# Patient Record
Sex: FEMALE | Race: WHITE | NOT HISPANIC OR LATINO | Employment: OTHER | URBAN - METROPOLITAN AREA
[De-identification: names, ages, dates, MRNs, and addresses within clinical notes are randomized per-mention and may not be internally consistent; named-entity substitution may affect disease eponyms.]

---

## 2018-01-22 ENCOUNTER — HOSPITAL ENCOUNTER (OUTPATIENT)
Dept: FAMILY MEDICINE CLINIC | Facility: CLINIC | Age: 73
Setting detail: SPECIMEN
Discharge: HOME | End: 2018-01-22
Attending: NURSE PRACTITIONER | Admitting: NURSE PRACTITIONER

## 2019-06-17 ENCOUNTER — TELEPHONE (OUTPATIENT)
Dept: PHYSICAL THERAPY | Facility: CLINIC | Age: 74
End: 2019-06-17

## 2019-06-18 ENCOUNTER — TRANSCRIBE ORDERS (OUTPATIENT)
Dept: PHYSICAL THERAPY | Facility: CLINIC | Age: 74
End: 2019-06-18

## 2019-06-18 DIAGNOSIS — Z96.651 PRESENCE OF RIGHT ARTIFICIAL KNEE JOINT: Primary | ICD-10-CM

## 2019-06-19 ENCOUNTER — TELEPHONE (OUTPATIENT)
Dept: PHYSICAL THERAPY | Facility: CLINIC | Age: 74
End: 2019-06-19

## 2019-06-19 ENCOUNTER — OFFICE VISIT (OUTPATIENT)
Dept: PHYSICAL THERAPY | Facility: CLINIC | Age: 74
End: 2019-06-19

## 2019-06-19 DIAGNOSIS — Z96.651 PRESENCE OF RIGHT ARTIFICIAL KNEE JOINT: ICD-10-CM

## 2019-06-19 PROCEDURE — 97110 THERAPEUTIC EXERCISES: CPT | Performed by: PHYSICAL THERAPIST

## 2019-06-19 PROCEDURE — 97140 MANUAL THERAPY 1/> REGIONS: CPT | Performed by: PHYSICAL THERAPIST

## 2019-06-19 NOTE — PROGRESS NOTES
Physical Therapy Daily Progress Note    VISIT#: 5    Subjective   Rachael Yen reports: No improvement per pt. Still going to aquatic exercise class, limited HEP participation. Pt received order for dynasplint to help with extension and is still trying to arrange this with rep.      Objective AROM extension = -18 deg pre rx. Knee flexed through stance phase.    See Exercise, Manual, and Modality Logs for complete treatment.     Patient Education: Reviewed POC, importance of HEP participation    Assessment/Plan Extension severely limited, negatively affecting gait.      Progress per Plan of Care            Timed:         Manual Therapy:    15     mins  77902;     Therapeutic Exercise:    25     mins  36043;     Neuromuscular Sedrick:        mins  62750;    Therapeutic Activity:          mins  31266;     Gait Training:           mins  03973;     Ultrasound:          mins  63339;    Ionto                                   mins   41427  Self Care                            mins   81979  Canalith Repos                   mins  4209    Un-Timed:  Electrical Stimulation:         mins  57341 ( );  Dry Needling          mins self-pay  Traction          mins 81896  Low Eval          Mins  66746  Mod Eval          Mins  16386  High Eval                            Mins  05701  Re-Eval                               mins  94455    Timed Treatment:   40   mins   Total Treatment:     40   mins    Gabino Guan, PT  [place] license: [id number]  Physical Therapist

## 2019-06-21 ENCOUNTER — OFFICE VISIT (OUTPATIENT)
Dept: PHYSICAL THERAPY | Facility: CLINIC | Age: 74
End: 2019-06-21

## 2019-06-21 DIAGNOSIS — Z96.651 PRESENCE OF RIGHT ARTIFICIAL KNEE JOINT: Primary | ICD-10-CM

## 2019-06-21 PROCEDURE — 97110 THERAPEUTIC EXERCISES: CPT | Performed by: PHYSICAL THERAPIST

## 2019-06-21 PROCEDURE — 97140 MANUAL THERAPY 1/> REGIONS: CPT | Performed by: PHYSICAL THERAPIST

## 2019-06-21 NOTE — PROGRESS NOTES
Physical Therapy Daily Progress Note    VISIT#: 3    Subjective   Pt reports: Pt has not received her dynamic ext brace yet per her report. HEP going well.       Objective AROM ext = -18 deg, PROM ext = -2 deg post rx.     See Exercise, Manual, and Modality Logs for complete treatment.     Patient Education: POC, HEP participation, purpose of dynamic brace     Assessment/Plan Pre rx extension ROM not improving. Pt reluctant to use dynamic brace. Will continue as tolerated.      Progress per Plan of Care            Timed:         Manual Therapy:    15     mins  51694;     Therapeutic Exercise:    30     mins  70706;     Neuromuscular Sedrick:        mins  64760;    Therapeutic Activity:          mins  87935;     Gait Training:           mins  58008;     Ultrasound:          mins  77444;    Ionto                                   mins   35407  Self Care                            mins   83713    Un-Timed:  Electrical Stimulation:         mins  80304 ( );  Dry Needling          mins self-pay  Traction          mins 03536  Low Eval          Mins  71474  Mod Eval          Mins  36203  High Eval                            Mins  98251  Re-Eval                               mins  56986    Timed Treatment:   45   mins   Total Treatment:     57   mins    Gabino Guan, PT  [place] license: [id number]  Physical Therapist

## 2019-06-24 ENCOUNTER — OFFICE VISIT (OUTPATIENT)
Dept: PHYSICAL THERAPY | Facility: CLINIC | Age: 74
End: 2019-06-24

## 2019-06-24 DIAGNOSIS — Z96.651 PRESENCE OF RIGHT ARTIFICIAL KNEE JOINT: Primary | ICD-10-CM

## 2019-06-24 PROCEDURE — 97140 MANUAL THERAPY 1/> REGIONS: CPT | Performed by: PHYSICAL THERAPIST

## 2019-06-24 PROCEDURE — 97110 THERAPEUTIC EXERCISES: CPT | Performed by: PHYSICAL THERAPIST

## 2019-06-24 NOTE — PROGRESS NOTES
Physical Therapy Daily Progress Note    VISIT#: 4    Subjective   Pt reports: Pt continues to report intermittent HEP participation but voices frustration re: lack of progress. However, she voices understanding of poor rehab prognosis associated with lack of participation in plan of care. Pt has not receive brace due to waiting on insurance approval per [employer] rep.       Objective AROM ext = 15 deg pre rx.     See Exercise, Manual, and Modality Logs for complete treatment.     Patient Education: HEP, Extensive discussion re: need to participate in POC    Assessment/Plan Pt showing no signs of significant improvement and voices limited participation in plan of care. Pt may be reaching her maximum level of function.      Progress per Plan of Care            Timed:         Manual Therapy:    10     mins  84588;     Therapeutic Exercise:    30     mins  55025;     Neuromuscular Sedrick:        mins  36459;    Therapeutic Activity:          mins  03657;     Gait Training:           mins  82723;     Ultrasound:          mins  06232;    Ionto                                   mins   68447  Self Care                            mins   17205    Un-Timed:  Electrical Stimulation:         mins  95551 ( );  Dry Needling          mins self-pay  Traction          mins 51473  Low Eval          Mins  59412  Mod Eval          Mins  82299  High Eval                            Mins  30354  Re-Eval                               mins  53529    Timed Treatment:   40   mins   Total Treatment:     40   mins    Gabino Guan, PT, DPT, OCS  [place] license: [id number]  Physical Therapist

## 2019-10-09 ENCOUNTER — DOCUMENTATION (OUTPATIENT)
Dept: PHYSICAL THERAPY | Facility: CLINIC | Age: 74
End: 2019-10-09

## 2019-10-09 DIAGNOSIS — Z96.651 PRESENCE OF RIGHT ARTIFICIAL KNEE JOINT: Primary | ICD-10-CM

## 2019-10-09 NOTE — PROGRESS NOTES
Discharge Report    Patient Name: Rachael Yen       Patient MRN: [id number]  : 1945  Physician: Job Dash MD  Date: 10/9/2019      Treatment has included therapeutic exercise, manual therapy, electrical stimulation and cryotherapy      Assessment:   Progress towards goals: Not Met    Discharge Reason: Patient did not attend final appointment      Plan of Care: Patient to return to referring/providing physician    Prognosis: poor    Understanding at Discharge: n/a    Gabino Guan PT, DPT, OCS  [place] license: [id number]  Physical Therapist

## 2022-08-19 ENCOUNTER — HOSPITAL ENCOUNTER (EMERGENCY)
Facility: HOSPITAL | Age: 77
Discharge: HOME | End: 2022-08-20
Attending: EMERGENCY MEDICINE | Admitting: EMERGENCY MEDICINE

## 2022-08-19 DIAGNOSIS — K80.20 CALCULUS OF GALLBLADDER WITHOUT CHOLECYSTITIS WITHOUT OBSTRUCTION: ICD-10-CM

## 2022-08-19 DIAGNOSIS — R10.11 RIGHT UPPER QUADRANT ABDOMINAL PAIN: Primary | ICD-10-CM

## 2022-08-19 PROCEDURE — 85025 COMPLETE CBC W/AUTO DIFF WBC: CPT | Performed by: EMERGENCY MEDICINE

## 2022-08-19 PROCEDURE — 81001 URINALYSIS AUTO W/SCOPE: CPT | Performed by: EMERGENCY MEDICINE

## 2022-08-19 PROCEDURE — 84484 ASSAY OF TROPONIN QUANT: CPT | Performed by: EMERGENCY MEDICINE

## 2022-08-19 PROCEDURE — 93005 ELECTROCARDIOGRAM TRACING: CPT | Performed by: EMERGENCY MEDICINE

## 2022-08-19 PROCEDURE — 80053 COMPREHEN METABOLIC PANEL: CPT | Performed by: EMERGENCY MEDICINE

## 2022-08-19 PROCEDURE — 85379 FIBRIN DEGRADATION QUANT: CPT | Performed by: EMERGENCY MEDICINE

## 2022-08-19 PROCEDURE — 83690 ASSAY OF LIPASE: CPT | Performed by: EMERGENCY MEDICINE

## 2022-08-19 PROCEDURE — 99283 EMERGENCY DEPT VISIT LOW MDM: CPT

## 2022-08-19 RX ORDER — SODIUM CHLORIDE 0.9 % (FLUSH) 0.9 %
10 SYRINGE (ML) INJECTION AS NEEDED
Status: DISCONTINUED | OUTPATIENT
Start: 2022-08-19 | End: 2022-08-20 | Stop reason: HOSPADM

## 2022-08-19 RX ADMIN — SODIUM CHLORIDE, POTASSIUM CHLORIDE, SODIUM LACTATE AND CALCIUM CHLORIDE 1000 ML: 600; 310; 30; 20 INJECTION, SOLUTION INTRAVENOUS at 23:56

## 2022-08-20 ENCOUNTER — APPOINTMENT (OUTPATIENT)
Dept: CT IMAGING | Facility: HOSPITAL | Age: 77
End: 2022-08-20

## 2022-08-20 VITALS
OXYGEN SATURATION: 95 % | RESPIRATION RATE: 17 BRPM | SYSTOLIC BLOOD PRESSURE: 118 MMHG | TEMPERATURE: 97.8 F | HEART RATE: 79 BPM | WEIGHT: 192.46 LBS | HEIGHT: 65 IN | BODY MASS INDEX: 32.07 KG/M2 | DIASTOLIC BLOOD PRESSURE: 63 MMHG

## 2022-08-20 LAB
ALBUMIN SERPL-MCNC: 4.6 G/DL (ref 3.5–5.2)
ALBUMIN/GLOB SERPL: 1.6 G/DL
ALP SERPL-CCNC: 90 U/L (ref 39–117)
ALT SERPL W P-5'-P-CCNC: 14 U/L (ref 1–33)
ANION GAP SERPL CALCULATED.3IONS-SCNC: 17 MMOL/L (ref 5–15)
AST SERPL-CCNC: 23 U/L (ref 1–32)
BACTERIA UR QL AUTO: ABNORMAL /HPF
BASOPHILS # BLD AUTO: 0.1 10*3/MM3 (ref 0–0.2)
BASOPHILS NFR BLD AUTO: 1.3 % (ref 0–1.5)
BILIRUB SERPL-MCNC: 0.5 MG/DL (ref 0–1.2)
BILIRUB UR QL STRIP: NEGATIVE
BUN SERPL-MCNC: 17 MG/DL (ref 8–23)
BUN/CREAT SERPL: 20.7 (ref 7–25)
CALCIUM SPEC-SCNC: 9.9 MG/DL (ref 8.6–10.5)
CHLORIDE SERPL-SCNC: 99 MMOL/L (ref 98–107)
CLARITY UR: CLEAR
CO2 SERPL-SCNC: 23 MMOL/L (ref 22–29)
COLOR UR: YELLOW
CREAT SERPL-MCNC: 0.82 MG/DL (ref 0.57–1)
D DIMER PPP FEU-MCNC: 1.03 MG/L (FEU) (ref 0–0.59)
DEPRECATED RDW RBC AUTO: 45.9 FL (ref 37–54)
EGFRCR SERPLBLD CKD-EPI 2021: 73.3 ML/MIN/1.73
EOSINOPHIL # BLD AUTO: 0.3 10*3/MM3 (ref 0–0.4)
EOSINOPHIL NFR BLD AUTO: 3 % (ref 0.3–6.2)
ERYTHROCYTE [DISTWIDTH] IN BLOOD BY AUTOMATED COUNT: 15.1 % (ref 12.3–15.4)
GLOBULIN UR ELPH-MCNC: 2.9 GM/DL
GLUCOSE SERPL-MCNC: 100 MG/DL (ref 65–99)
GLUCOSE UR STRIP-MCNC: NEGATIVE MG/DL
HCT VFR BLD AUTO: 43 % (ref 34–46.6)
HGB BLD-MCNC: 14.6 G/DL (ref 12–15.9)
HGB UR QL STRIP.AUTO: ABNORMAL
HYALINE CASTS UR QL AUTO: ABNORMAL /LPF
KETONES UR QL STRIP: NEGATIVE
LEUKOCYTE ESTERASE UR QL STRIP.AUTO: NEGATIVE
LIPASE SERPL-CCNC: 34 U/L (ref 13–60)
LYMPHOCYTES # BLD AUTO: 2.6 10*3/MM3 (ref 0.7–3.1)
LYMPHOCYTES NFR BLD AUTO: 30.7 % (ref 19.6–45.3)
MCH RBC QN AUTO: 29.6 PG (ref 26.6–33)
MCHC RBC AUTO-ENTMCNC: 33.9 G/DL (ref 31.5–35.7)
MCV RBC AUTO: 87.3 FL (ref 79–97)
MONOCYTES # BLD AUTO: 1.2 10*3/MM3 (ref 0.1–0.9)
MONOCYTES NFR BLD AUTO: 13.8 % (ref 5–12)
NEUTROPHILS NFR BLD AUTO: 4.3 10*3/MM3 (ref 1.7–7)
NEUTROPHILS NFR BLD AUTO: 51.2 % (ref 42.7–76)
NITRITE UR QL STRIP: NEGATIVE
NRBC BLD AUTO-RTO: 0.1 /100 WBC (ref 0–0.2)
PH UR STRIP.AUTO: 5.5 [PH] (ref 5–8)
PLATELET # BLD AUTO: 251 10*3/MM3 (ref 140–450)
PMV BLD AUTO: 8.1 FL (ref 6–12)
POTASSIUM SERPL-SCNC: 3.8 MMOL/L (ref 3.5–5.2)
PROT SERPL-MCNC: 7.5 G/DL (ref 6–8.5)
PROT UR QL STRIP: NEGATIVE
RBC # BLD AUTO: 4.93 10*6/MM3 (ref 3.77–5.28)
RBC # UR STRIP: ABNORMAL /HPF
REF LAB TEST METHOD: ABNORMAL
SODIUM SERPL-SCNC: 139 MMOL/L (ref 136–145)
SP GR UR STRIP: <=1.005 (ref 1–1.03)
SQUAMOUS #/AREA URNS HPF: ABNORMAL /HPF
TROPONIN T SERPL-MCNC: <0.01 NG/ML (ref 0–0.03)
UROBILINOGEN UR QL STRIP: ABNORMAL
WBC # UR STRIP: ABNORMAL /HPF
WBC NRBC COR # BLD: 8.5 10*3/MM3 (ref 3.4–10.8)

## 2022-08-20 PROCEDURE — 0 IOPAMIDOL PER 1 ML: Performed by: EMERGENCY MEDICINE

## 2022-08-20 PROCEDURE — 71275 CT ANGIOGRAPHY CHEST: CPT

## 2022-08-20 PROCEDURE — 74177 CT ABD & PELVIS W/CONTRAST: CPT

## 2022-08-20 RX ADMIN — IOPAMIDOL 100 ML: 755 INJECTION, SOLUTION INTRAVENOUS at 01:29

## 2022-08-20 NOTE — DISCHARGE INSTRUCTIONS
[place] diet no greasy fried fatty spicy food no caffeine.  Pepcid AC.  Return for fever increasing pain reoccurrence of symptoms turning yellow uncontrolled pain vomiting cannot hold anything down chest pain neck arm jaw pain shortness of breath or any other new or worse problems or concerns

## 2022-08-20 NOTE — ED PROVIDER NOTES
Subjective   Complaint right side pain    History of present illness a 77-year-old female who complains 1 day history of some pain intermittently in that right side of the ribs she thinks it is due to her gallbladder food poisoning or appendix.  There is no chest pain neck arm jaw pain or shortness of breath.  This been intermittent throughout the day worse over the last few hours but now improving.  Patient states he got worse after she ate some over ripe apricots and drink some milk.  There is no dizziness or syncope.  No sweating she had vomited once no diarrhea no black or bloody stool no urinary complaints.  Location right upper abdomen intermittent severe sharp and stabbing aching in nature nonradiating she has been at home and has the above associated symptoms.  No fever chills or night sweats          Review of Systems   Constitutional: Negative for chills and fever.   HENT: Negative for congestion and sinus pressure.    Eyes: Negative for photophobia and visual disturbance.   Respiratory: Negative for cough, chest tightness and shortness of breath.    Cardiovascular: Negative for chest pain and palpitations.   Gastrointestinal: Positive for abdominal pain and vomiting. Negative for blood in stool.   Endocrine: Negative for cold intolerance and heat intolerance.   Genitourinary: Negative for difficulty urinating and dysuria.   Musculoskeletal: Negative for back pain and neck pain.   Skin: Negative for color change and rash.   Neurological: Negative for dizziness and light-headedness.   Psychiatric/Behavioral: Negative for agitation and behavioral problems.       No past medical history on file.  No health problems  No Known Allergies    No past surgical history on file.  Partial hysterectomy  No family history on file.  Family history of some heart disease hypertension  Social History     Socioeconomic History   • Marital status: Single   Social history lives at home no tobacco no vaping no alcohol no  drug  Medications none      Objective   Physical Exam  Constitutional is a 77-year-old female awake alert no distress temperature 97.8 blood pressure 129/69 heart rate 86 respirations 17 sats 97% on room air.  HEENT extraocular muscles are intact pupils equal reactive sclerae clear mouth clear.  Neck supple no adenopathy no JVD no bruits no meningeal signs.  Lungs clear no retractions.  Back no CVA tenderness.  Heart regular without murmur rub.  Abdomen soft nontender nondistended good bowel sounds no peritoneal findings or pulsatile masses.  There is minimal tenderness right upper quadrant but no Mena sign.  Extremities pulses are equal throughout upper and lower extremities no edema cords or Homans' sign or evidence of DVT.  Skin is warm and dry without rashes or cellulitic change.  Neurologic awake alert follows commands motor strength normal without focal weakness  Procedures           ED Course      Results for orders placed or performed during the hospital encounter of 08/19/22   Comprehensive Metabolic Panel    Specimen: Arm, Right; Blood   Result Value Ref Range    Glucose 100 (H) 65 - 99 mg/dL    BUN 17 8 - 23 mg/dL    Creatinine 0.82 0.57 - 1.00 mg/dL    Sodium 139 136 - 145 mmol/L    Potassium 3.8 3.5 - 5.2 mmol/L    Chloride 99 98 - 107 mmol/L    CO2 23.0 22.0 - 29.0 mmol/L    Calcium 9.9 8.6 - 10.5 mg/dL    Total Protein 7.5 6.0 - 8.5 g/dL    Albumin 4.60 3.50 - 5.20 g/dL    ALT (SGPT) 14 1 - 33 U/L    AST (SGOT) 23 1 - 32 U/L    Alkaline Phosphatase 90 39 - 117 U/L    Total Bilirubin 0.5 0.0 - 1.2 mg/dL    Globulin 2.9 gm/dL    A/G Ratio 1.6 g/dL    BUN/Creatinine Ratio 20.7 7.0 - 25.0    Anion Gap 17.0 (H) 5.0 - 15.0 mmol/L    eGFR 73.3 >60.0 mL/min/1.73   Lipase    Specimen: Arm, Right; Blood   Result Value Ref Range    Lipase 34 13 - 60 U/L   Urinalysis With Microscopic If Indicated (No Culture) - Urine, Clean Catch    Specimen: Urine, Clean Catch   Result Value Ref Range    Color, UA Yellow  Yellow, Straw    Appearance, UA Clear Clear    pH, UA 5.5 5.0 - 8.0    Specific Gravity, UA <=1.005 1.005 - 1.030    Glucose, UA Negative Negative    Ketones, UA Negative Negative    Bilirubin, UA Negative Negative    Blood, UA Trace (A) Negative    Protein, UA Negative Negative    Leuk Esterase, UA Negative Negative    Nitrite, UA Negative Negative    Urobilinogen, UA 0.2 E.U./dL 0.2 - 1.0 E.U./dL   D-dimer, Quantitative    Specimen: Arm, Right; Blood   Result Value Ref Range    D-Dimer, Quantitative 1.03 (H) 0.00 - 0.59 mg/L (FEU)   Troponin    Specimen: Arm, Right; Blood   Result Value Ref Range    Troponin T <0.010 0.000 - 0.030 ng/mL   CBC Auto Differential    Specimen: Arm, Right; Blood   Result Value Ref Range    WBC 8.50 3.40 - 10.80 10*3/mm3    RBC 4.93 3.77 - 5.28 10*6/mm3    Hemoglobin 14.6 12.0 - 15.9 g/dL    Hematocrit 43.0 34.0 - 46.6 %    MCV 87.3 79.0 - 97.0 fL    MCH 29.6 26.6 - 33.0 pg    MCHC 33.9 31.5 - 35.7 g/dL    RDW 15.1 12.3 - 15.4 %    RDW-SD 45.9 37.0 - 54.0 fl    MPV 8.1 6.0 - 12.0 fL    Platelets 251 140 - 450 10*3/mm3    Neutrophil % 51.2 42.7 - 76.0 %    Lymphocyte % 30.7 19.6 - 45.3 %    Monocyte % 13.8 (H) 5.0 - 12.0 %    Eosinophil % 3.0 0.3 - 6.2 %    Basophil % 1.3 0.0 - 1.5 %    Neutrophils, Absolute 4.30 1.70 - 7.00 10*3/mm3    Lymphocytes, Absolute 2.60 0.70 - 3.10 10*3/mm3    Monocytes, Absolute 1.20 (H) 0.10 - 0.90 10*3/mm3    Eosinophils, Absolute 0.30 0.00 - 0.40 10*3/mm3    Basophils, Absolute 0.10 0.00 - 0.20 10*3/mm3    nRBC 0.1 0.0 - 0.2 /100 WBC   Urinalysis, Microscopic Only - Urine, Clean Catch    Specimen: Urine, Clean Catch   Result Value Ref Range    RBC, UA 0-2 (A) None Seen /HPF    WBC, UA 0-2 (A) None Seen /HPF    Bacteria, UA None Seen None Seen /HPF    Squamous Epithelial Cells, UA 0-2 None Seen, 0-2 /HPF    Hyaline Casts, UA None Seen None Seen /LPF    Methodology Automated Microscopy    ECG 12 Lead   Result Value Ref Range    QT Interval 380 ms     CT  Abdomen Pelvis With Contrast    Result Date: 8/20/2022  1. No acute abnormality. 2. Extensive colonic diverticulosis. 3. Constipation. 4. Cholelithiasis. 5. Severe atherosclerotic disease. Slot 67 Electronically signed by:  Kike Delaney M.D.  8/20/2022 12:08 AM    CT Angiogram Chest Pulmonary Embolism    Result Date: 8/20/2022  1. No pulmonary embolus. 2. There is mild aneurysmal dilatation of the ascending aorta measuring 4.1 cm. There are severe atherosclerotic changes throughout the thoracic aorta. 3. Small hiatal hernia. 4. Borderline cardiomegaly. Slot 67 Electronically signed by:  Kike Delaney M.D.  8/20/2022 12:01 AM    Medications   lactated ringers bolus 1,000 mL (0 mL Intravenous Stopped 8/20/22 0130)   iopamidol (ISOVUE-370) 76 % injection 100 mL (100 mL Intravenous Given 8/20/22 0129)          EKG my interpretation normal sinus rhythm rate 82 right bundle branch block QTC of 443 nothing old to compare with abnormal EKG                                  MDM  Number of Diagnoses or Management Options  Calculus of gallbladder without cholecystitis without obstruction: new and requires workup  Right upper quadrant abdominal pain: new and requires workup  Diagnosis management comments: Medical decision making.  Patient had IV established given a liter lactated Ringer's bolus and had the above exam evaluation EKG obtained on my review showed a sinus rhythm with right bundle branch block but nothing old to compare with.  The patient laboratory evaluation reviewed by me chemistries unremarkable the patient had a liver panel which was negative lipase normal urine negative D-dimer 1.03 opponent was negative CBC normal patient underwent a CT scan chest abdomen pelvis.  There was no pulmonary embolism had mild aneurysmal dilatation of the ascending aorta at 4.1 cm.  Severe atherosclerotic changes throughout the aorta hiatal hernia and some mild cardiomegaly.  No dissection CT abdomen pelvis showed extensive  diverticulosis without diverticulitis constipation cholelithiasis but no acute cholecystitis.  Atherosclerotic disease noted.  The patient on repeat exam was resting company she had no pain.  I see no evidence of DVT pulmonary embolism no evidence of PE or dissection or acute myocardial infarction.  No evidence of sepsis no evidence of cholecystitis.  No evidence of an AAA.  The patient was made aware of the findings and her pain initially was in the right upper quadrant and started after eating some overnight apricots and drinking milk.  I suspect her symptoms of consistent with a gallbladder appendix was normal.  Patient desires to go home.  She wants to follow with a surgeon outpatient.  We talked about what to return for talked about gallbladder diet information provided.  There is no evidence of acute pancreatitis or obstructive lesions anywhere.  This not a complete list of all possibilities of abdominal pain.  No evidence of acute cardiac issues currently.  She referred for outpatient surgery and surgeon.  She voiced understanding and was discharged home for outpatient management.  All labs CT scans obtained reviewed by me as well as EKG.       Amount and/or Complexity of Data Reviewed  Clinical lab tests: reviewed  Tests in the radiology section of CPT®: reviewed  Tests in the medicine section of CPT®: reviewed    Risk of Complications, Morbidity, and/or Mortality  Presenting problems: high  Diagnostic procedures: high  Management options: high    Patient Progress  Patient progress: stable      Final diagnoses:   Right upper quadrant abdominal pain   Calculus of gallbladder without cholecystitis without obstruction       ED Disposition  ED Disposition     ED Disposition   Discharge    Condition   Stable    Comment   --             Joaquin Waite MD  [address] [place] [address]  [phone]    Call in 2 days  Call Monday to set up appointment for your gallbladder.    Finn Fountain,  MD [address]  [place] [address]  [phone]    In 3 days  Small 4 cm ascending thoracic aortic aneurysm please schedule follow-up         Medication List      No changes were made to your prescriptions during this visit.          Marty Webber MD  08/20/22 0714

## 2022-08-20 NOTE — ED NOTES
Patient complains of abdominal pain, vomiting, and right rib pain. States she has not been feeling well for two days. States she does not take medications at home. Family at bedside

## 2022-08-22 LAB — QT INTERVAL: 380 MS

## 2022-09-01 ENCOUNTER — OFFICE VISIT (OUTPATIENT)
Dept: SURGERY | Facility: CLINIC | Age: 77
End: 2022-09-01

## 2022-09-01 VITALS
OXYGEN SATURATION: 98 % | RESPIRATION RATE: 18 BRPM | HEIGHT: 65 IN | HEART RATE: 61 BPM | SYSTOLIC BLOOD PRESSURE: 137 MMHG | WEIGHT: 191 LBS | TEMPERATURE: 98 F | BODY MASS INDEX: 31.82 KG/M2 | DIASTOLIC BLOOD PRESSURE: 81 MMHG

## 2022-09-01 DIAGNOSIS — K80.20 SYMPTOMATIC CHOLELITHIASIS: Primary | ICD-10-CM

## 2022-09-01 PROCEDURE — 99204 OFFICE O/P NEW MOD 45 MIN: CPT | Performed by: SURGERY

## 2022-09-01 RX ORDER — SODIUM CHLORIDE 0.9 % (FLUSH) 0.9 %
3-10 SYRINGE (ML) INJECTION AS NEEDED
Status: CANCELLED | OUTPATIENT
Start: 2022-09-01

## 2022-09-01 RX ORDER — SODIUM CHLORIDE 9 MG/ML
100 INJECTION, SOLUTION INTRAVENOUS CONTINUOUS
Status: CANCELLED | OUTPATIENT
Start: 2022-09-01

## 2022-09-01 RX ORDER — SODIUM CHLORIDE 0.9 % (FLUSH) 0.9 %
3 SYRINGE (ML) INJECTION EVERY 12 HOURS SCHEDULED
Status: CANCELLED | OUTPATIENT
Start: 2022-09-01

## 2022-09-01 NOTE — PROGRESS NOTES
"Subjective   Rachael Yen is a 77 y.o. female.   Chief Complaint   Patient presents with   • Cholelithiasis     NP Ref [facility] ER, Gallstones      /81 (BP Location: Right arm, Patient Position: Sitting, Cuff Size: Adult)   Pulse 61   Temp 98 °F (36.7 °C) (Infrared)   Resp 18   Ht 165.1 cm (65\")   Wt 86.6 kg (191 lb)   SpO2 98%   BMI 31.78 kg/m²     HISTORY OF PRESENT ILLNESS:  77-year-old lady is been referred to me after seen in the emergency department for epigastric and right upper quad abdominal pain.  She says that it was a sharp pain that came acutely on last about an hour to resolve by itself.  Has happened before several months ago.  It was associated with severe nausea and vomiting.  Usually happens in the evenings.  Unsure about any other inciting factor.  On arrival in the emergency department that she was found to have a tender abdominal exam.  She had no significant leukocytosis her LFTs were normal.  She had a CT scan demonstrating cholelithiasis and then was referred to me for symptomatic cholelithiasis.      No outpatient encounter medications on file as of 9/1/2022.     No facility-administered encounter medications on file as of 9/1/2022.         The following portions of the patient's history were reviewed and updated as appropriate: allergies, current medications, past family history, past medical history, past social history, past surgical history and problem list.    Review of Systems  Complete review of system has been obtained and is negative.  Objective     Physical Exam  Constitutional:       General: She is not in acute distress.     Appearance: She is ill-appearing.   HENT:      Head: Normocephalic and atraumatic.   Eyes:      General: No scleral icterus.     Conjunctiva/sclera: Conjunctivae normal.   Cardiovascular:      Rate and Rhythm: Normal rate.   Pulmonary:      Effort: Pulmonary effort is normal. No respiratory distress.   Abdominal:      General: There is no " distension.      Palpations: Abdomen is soft.      Tenderness: There is no abdominal tenderness.   Musculoskeletal:         General: Swelling present. No tenderness.   Skin:     General: Skin is warm and dry.   Neurological:      General: No focal deficit present.      Mental Status: Mental status is at baseline.   Psychiatric:         Mood and Affect: Mood normal.         Behavior: Behavior normal.           Assessment & Plan   Diagnoses and all orders for this visit:    1. Symptomatic cholelithiasis (Primary)  -     Case Request; Standing  -     Case Request    Other orders  -     Follow Anesthesia Guidelines / Standing Orders; Future  -     Obtain Informed Consent; Future  -     Provide NPO Instructions to Patient; Future  -     Chlorhexidine Skin Prep; Future    I counseled her about the findings on her CT scan the right upper quadrant symptoms and that she could have symptomatic cholelithiasis.  There was not any LFT abnormality to suggest common bile duct stone.  We talked about nonoperative management.  We talked about surgical management including laparoscopic and possible open cholecystectomy.  We talked with the steps of the operation to spitter cover the possible complications even talked about bile duct injury and open surgery.  After discussion about symptomatic cholelithiasis she understands the risk of the surgery and does want to proceed.    Is counseling about major abdominal surgery with risk factors for bitty and moderate data reviewed including CT scan abdomen and pelvis the emergency department notes her lab work including CBC and CMP.    Joaquin Waite MD  9/1/2022  1:25 PM EDT    This note was created using Dragon Voice Recognition software.

## 2022-09-08 ENCOUNTER — LAB (OUTPATIENT)
Dept: LAB | Facility: HOSPITAL | Age: 77
End: 2022-09-08

## 2022-09-08 LAB
DEPRECATED RDW RBC AUTO: 41.7 FL (ref 37–54)
ERYTHROCYTE [DISTWIDTH] IN BLOOD BY AUTOMATED COUNT: 13.3 % (ref 12.3–15.4)
HCT VFR BLD AUTO: 42.3 % (ref 34–46.6)
HGB BLD-MCNC: 14 G/DL (ref 12–15.9)
MCH RBC QN AUTO: 28.2 PG (ref 26.6–33)
MCHC RBC AUTO-ENTMCNC: 33.1 G/DL (ref 31.5–35.7)
MCV RBC AUTO: 85.1 FL (ref 79–97)
PLATELET # BLD AUTO: 248 10*3/MM3 (ref 140–450)
PMV BLD AUTO: 10.8 FL (ref 6–12)
RBC # BLD AUTO: 4.97 10*6/MM3 (ref 3.77–5.28)
WBC NRBC COR # BLD: 8.27 10*3/MM3 (ref 3.4–10.8)

## 2022-09-08 PROCEDURE — 85027 COMPLETE CBC AUTOMATED: CPT

## 2022-09-21 ENCOUNTER — HOSPITAL ENCOUNTER (OUTPATIENT)
Facility: HOSPITAL | Age: 77
Setting detail: HOSPITAL OUTPATIENT SURGERY
Discharge: HOME | End: 2022-09-21
Attending: SURGERY | Admitting: SURGERY

## 2022-09-21 ENCOUNTER — ANESTHESIA EVENT (OUTPATIENT)
Dept: PERIOP | Facility: HOSPITAL | Age: 77
End: 2022-09-21

## 2022-09-21 ENCOUNTER — ANESTHESIA (OUTPATIENT)
Dept: PERIOP | Facility: HOSPITAL | Age: 77
End: 2022-09-21

## 2022-09-21 VITALS
RESPIRATION RATE: 9 BRPM | HEART RATE: 57 BPM | SYSTOLIC BLOOD PRESSURE: 158 MMHG | WEIGHT: 192.6 LBS | DIASTOLIC BLOOD PRESSURE: 85 MMHG | BODY MASS INDEX: 30.95 KG/M2 | HEIGHT: 66 IN | TEMPERATURE: 97.9 F | OXYGEN SATURATION: 100 %

## 2022-09-21 DIAGNOSIS — K80.20 SYMPTOMATIC CHOLELITHIASIS: ICD-10-CM

## 2022-09-21 PROCEDURE — 25010000002 PROPOFOL 10 MG/ML EMULSION: Performed by: NURSE ANESTHETIST, CERTIFIED REGISTERED

## 2022-09-21 PROCEDURE — 88304 TISSUE EXAM BY PATHOLOGIST: CPT | Performed by: SURGERY

## 2022-09-21 PROCEDURE — 25010000002 FENTANYL CITRATE (PF) 50 MCG/ML SOLUTION: Performed by: NURSE ANESTHETIST, CERTIFIED REGISTERED

## 2022-09-21 PROCEDURE — 47562 LAPAROSCOPIC CHOLECYSTECTOMY: CPT | Performed by: SURGERY

## 2022-09-21 PROCEDURE — 25010000002 FENTANYL CITRATE (PF) 100 MCG/2ML SOLUTION: Performed by: NURSE ANESTHETIST, CERTIFIED REGISTERED

## 2022-09-21 PROCEDURE — 25010000002 CEFAZOLIN PER 500 MG: Performed by: SURGERY

## 2022-09-21 PROCEDURE — 25010000002 HYDROMORPHONE PER 4 MG: Performed by: NURSE ANESTHETIST, CERTIFIED REGISTERED

## 2022-09-21 PROCEDURE — 25010000002 DEXAMETHASONE PER 1 MG: Performed by: NURSE ANESTHETIST, CERTIFIED REGISTERED

## 2022-09-21 PROCEDURE — 25010000002 ONDANSETRON PER 1 MG: Performed by: NURSE ANESTHETIST, CERTIFIED REGISTERED

## 2022-09-21 DEVICE — HEMOST ABS SURGICEL PWDR 3GM: Type: IMPLANTABLE DEVICE | Site: ABDOMEN | Status: FUNCTIONAL

## 2022-09-21 RX ORDER — DEXAMETHASONE SODIUM PHOSPHATE 4 MG/ML
INJECTION, SOLUTION INTRA-ARTICULAR; INTRALESIONAL; INTRAMUSCULAR; INTRAVENOUS; SOFT TISSUE AS NEEDED
Status: DISCONTINUED | OUTPATIENT
Start: 2022-09-21 | End: 2022-09-21 | Stop reason: SURG

## 2022-09-21 RX ORDER — OXYCODONE HYDROCHLORIDE 5 MG/1
5 TABLET ORAL ONCE AS NEEDED
Status: DISCONTINUED | OUTPATIENT
Start: 2022-09-21 | End: 2022-09-21 | Stop reason: HOSPADM

## 2022-09-21 RX ORDER — SODIUM CHLORIDE, SODIUM LACTATE, POTASSIUM CHLORIDE, AND CALCIUM CHLORIDE .6; .31; .03; .02 G/100ML; G/100ML; G/100ML; G/100ML
20 INJECTION, SOLUTION INTRAVENOUS CONTINUOUS
Status: DISCONTINUED | OUTPATIENT
Start: 2022-09-21 | End: 2022-09-21 | Stop reason: HOSPADM

## 2022-09-21 RX ORDER — HYDROMORPHONE HCL 110MG/55ML
PATIENT CONTROLLED ANALGESIA SYRINGE INTRAVENOUS AS NEEDED
Status: DISCONTINUED | OUTPATIENT
Start: 2022-09-21 | End: 2022-09-21 | Stop reason: SURG

## 2022-09-21 RX ORDER — ONDANSETRON 2 MG/ML
INJECTION INTRAMUSCULAR; INTRAVENOUS AS NEEDED
Status: DISCONTINUED | OUTPATIENT
Start: 2022-09-21 | End: 2022-09-21 | Stop reason: SURG

## 2022-09-21 RX ORDER — HYDROCODONE BITARTRATE AND ACETAMINOPHEN 5; 325 MG/1; MG/1
1 TABLET ORAL EVERY 4 HOURS PRN
Qty: 10 TABLET | Refills: 0 | Status: SHIPPED | OUTPATIENT
Start: 2022-09-21 | End: 2022-10-04

## 2022-09-21 RX ORDER — FENTANYL CITRATE 50 UG/ML
INJECTION, SOLUTION INTRAMUSCULAR; INTRAVENOUS AS NEEDED
Status: DISCONTINUED | OUTPATIENT
Start: 2022-09-21 | End: 2022-09-21 | Stop reason: SURG

## 2022-09-21 RX ORDER — PROPOFOL 10 MG/ML
VIAL (ML) INTRAVENOUS AS NEEDED
Status: DISCONTINUED | OUTPATIENT
Start: 2022-09-21 | End: 2022-09-21 | Stop reason: SURG

## 2022-09-21 RX ORDER — SODIUM CHLORIDE 9 MG/ML
100 INJECTION, SOLUTION INTRAVENOUS CONTINUOUS
Status: DISCONTINUED | OUTPATIENT
Start: 2022-09-21 | End: 2022-09-21 | Stop reason: HOSPADM

## 2022-09-21 RX ORDER — FENTANYL CITRATE 50 UG/ML
50 INJECTION, SOLUTION INTRAMUSCULAR; INTRAVENOUS
Status: DISCONTINUED | OUTPATIENT
Start: 2022-09-21 | End: 2022-09-21 | Stop reason: HOSPADM

## 2022-09-21 RX ORDER — BUPIVACAINE HYDROCHLORIDE AND EPINEPHRINE 2.5; 5 MG/ML; UG/ML
INJECTION, SOLUTION EPIDURAL; INFILTRATION; INTRACAUDAL; PERINEURAL AS NEEDED
Status: DISCONTINUED | OUTPATIENT
Start: 2022-09-21 | End: 2022-09-21 | Stop reason: HOSPADM

## 2022-09-21 RX ORDER — HYDROCODONE BITARTRATE AND ACETAMINOPHEN 10; 325 MG/1; MG/1
1 TABLET ORAL EVERY 4 HOURS PRN
Status: DISCONTINUED | OUTPATIENT
Start: 2022-09-21 | End: 2022-09-21 | Stop reason: HOSPADM

## 2022-09-21 RX ORDER — SODIUM CHLORIDE 0.9 % (FLUSH) 0.9 %
3-10 SYRINGE (ML) INJECTION AS NEEDED
Status: DISCONTINUED | OUTPATIENT
Start: 2022-09-21 | End: 2022-09-21 | Stop reason: HOSPADM

## 2022-09-21 RX ORDER — SODIUM CHLORIDE 0.9 % (FLUSH) 0.9 %
3 SYRINGE (ML) INJECTION EVERY 12 HOURS SCHEDULED
Status: DISCONTINUED | OUTPATIENT
Start: 2022-09-21 | End: 2022-09-21 | Stop reason: HOSPADM

## 2022-09-21 RX ADMIN — CEFAZOLIN 2 G: 2 INJECTION, POWDER, FOR SOLUTION INTRAMUSCULAR; INTRAVENOUS at 07:29

## 2022-09-21 RX ADMIN — FENTANYL CITRATE 50 MCG: 50 INJECTION, SOLUTION INTRAMUSCULAR; INTRAVENOUS at 07:44

## 2022-09-21 RX ADMIN — FENTANYL CITRATE 50 MCG: 50 INJECTION, SOLUTION INTRAMUSCULAR; INTRAVENOUS at 09:03

## 2022-09-21 RX ADMIN — FENTANYL CITRATE 50 MCG: 50 INJECTION, SOLUTION INTRAMUSCULAR; INTRAVENOUS at 08:38

## 2022-09-21 RX ADMIN — ONDANSETRON 8 MG: 2 INJECTION INTRAMUSCULAR; INTRAVENOUS at 07:47

## 2022-09-21 RX ADMIN — HYDROMORPHONE HYDROCHLORIDE 0.5 MG: 2 INJECTION, SOLUTION INTRAMUSCULAR; INTRAVENOUS; SUBCUTANEOUS at 07:54

## 2022-09-21 RX ADMIN — DEXAMETHASONE SODIUM PHOSPHATE 4 MG: 4 INJECTION, SOLUTION INTRAMUSCULAR; INTRAVENOUS at 07:47

## 2022-09-21 RX ADMIN — FENTANYL CITRATE 50 MCG: 50 INJECTION, SOLUTION INTRAMUSCULAR; INTRAVENOUS at 07:29

## 2022-09-21 RX ADMIN — PROPOFOL 200 MG: 10 INJECTION, EMULSION INTRAVENOUS at 07:29

## 2022-09-21 RX ADMIN — HYDROCODONE BITARTRATE AND ACETAMINOPHEN 1 TABLET: 10; 325 TABLET ORAL at 08:38

## 2022-09-21 NOTE — ANESTHESIA PREPROCEDURE EVALUATION
Anesthesia Evaluation     Patient summary reviewed and Nursing notes reviewed   no history of anesthetic complications:  NPO Solid Status: > 8 hours  NPO Liquid Status: > 2 hours           Airway   Mallampati: II  TM distance: >3 FB  Neck ROM: full  No difficulty expected  Dental - normal exam     Pulmonary - negative pulmonary ROS and normal exam   Cardiovascular - negative cardio ROS and normal exam        Neuro/Psych- negative ROS  GI/Hepatic/Renal/Endo - negative ROS     Musculoskeletal     Abdominal  - normal exam   Substance History - negative use     OB/GYN negative ob/gyn ROS         Other   arthritis,                      Anesthesia Plan    ASA 2     general     intravenous induction     Anesthetic plan, risks, benefits, and alternatives have been provided, discussed and informed consent has been obtained with: patient.  Pre-procedure education provided  Use of blood products discussed with patient  Consented to blood products.   Plan discussed with CRNA.        CODE STATUS:

## 2022-09-21 NOTE — ANESTHESIA PROCEDURE NOTES
Airway  Urgency: elective    Date/Time: 9/21/2022 7:28 AM    General Information and Staff    Patient location during procedure: OR    Indications and Patient Condition  Indications for airway management: airway protection    Preoxygenated: yes  MILS maintained throughout  Mask difficulty assessment: 1 - vent by mask    Final Airway Details  Final airway type: endotracheal airway      Successful airway: ETT  Cuffed: yes   Successful intubation technique: direct laryngoscopy  Facilitating devices/methods: intubating stylet  Blade: Ngozi  Blade size: 3  ETT size (mm): 7.0  Cormack-Lehane Classification: grade IIa - partial view of glottis  Placement verified by: chest auscultation   Inital cuff pressure (cm H2O): 5  Measured from: lips  ETT/EBT  to lips (cm): 22  Number of attempts at approach: 1  Assessment: lips, teeth, and gum same as pre-op

## 2022-09-21 NOTE — OP NOTE
Operative Report:    Patient Name:  Rachael Yen  YOB: 1945    Date of Surgery:  9/21/2022     Indications: 77-year-old lady sent to me for cholecystectomy for symptomatic cholelithiasis lithiasis.  I counseled her about the risk and benefits of the cholecystectomy she understood and wished to proceed.    Pre-op Diagnosis:   Symptomatic cholelithiasis [K80.20]       Post-Op Diagnosis Codes:     * Symptomatic cholelithiasis [K80.20]    Procedure/CPT® Codes:      Procedure(s):  CHOLECYSTECTOMY LAPAROSCOPIC    Staff:  Surgeon(s):  Joaquin Waite MD    Circulator: Estelita Miranda RN  Scrub Person: Isis Fonseca  Assistant: Azalia Singh  was responsible for performing the following activities: Retraction, Closing and Held/Positioned Camera and their skilled assistance was necessary for the success of this case.        Anesthesia: General    Estimated Blood Loss: minimal    Implants:    Implant Name Type Inv. Item Serial No.  Lot No. LRB No. Used Action   HEMOST ABS SURGICEL PWDR 3GM - [id number] Implant HEMOST ABS SURGICEL PWDR 3GM  [employer] [id number] N/A 1 Implanted       Specimen:          Specimens     ID Source Type Tests Collected By Collected At Frozen?    A Gallbladder Tissue · TISSUE PATHOLOGY EXAM   Joaquin Waite MD 9/21/22 9417     Description: gallbladder    This specimen was not marked as sent.              Findings: Gallbladder containing gallstones    Complications: None    Description of Procedure: Patient was taken to the operating room placed on the operating table in supine position under general anesthesia.  Her abdomen was prepped and draped normal sterile fashion.  Timeout was performed identifying the correct patient procedure and site of operation.  I entered the abdomen through a left upper quadrant 5 mm optical trocar entry upon entry the abdominal cavity the abdomen was fully insufflated camera produces no evidence of injury to underlying  structures.  She was placed in reverse Trendelenburg with right side up.  A periumbilical 12 mm port and 2 right subcostal 5 mm ports were placed under laparoscopic guidance.  Gallbladder was inspected and retractors right upper quadrant I was able to dissected away some omental adhesions.  During this process she had easy bleeding from all of the dissected tissues.  Hemostasis was achieved on the omentum.  I then retracted the infundibulum laterally and dissected out the cystic duct and cystic artery.  I got the base the gallbladder free off the liver edge revealed the critical view.  I then doubly clipped and divided the cystic duct and cystic artery and divided the gallbladder off the liver using the cautery.  There was minimal bile spillage and some minor bleeding during this process.  It was placed in Endo Catch bag and removed.  The gallbladder sent to pathology for review.  Containing gallstones.  The dissection planes were inspected the Bovie was used to achieve hemostasis.  I also placed some Surgicel powder to help with that hemostasis as she did bleed fairly easily.  The ports were serially moved out any abdominal hemorrhage.  The 12 mm port site was closed with 0 Vicryl suture and suture passer.  The skin was closed with 4-0 Vicryl suture and skin affix.  She tolerated seizure well transferred to recovery in satisfactory condition.      Joaquin Waite MD     Date: 9/21/2022  Time: 08:08 EDT    This note was created using Dragon Voice Recognition software.

## 2022-09-21 NOTE — ANESTHESIA POSTPROCEDURE EVALUATION
Patient: Rachael Yen    Procedure Summary     Date: 09/21/22 Room / Location: [facility] OR 06 / [facility] MAIN OR    Anesthesia Start: 0719 Anesthesia Stop: 0827    Procedure: CHOLECYSTECTOMY LAPAROSCOPIC (N/A Abdomen) Diagnosis:       Symptomatic cholelithiasis      (Symptomatic cholelithiasis [K80.20])    Surgeons: Joaquin Waite MD Provider: Esau Menon MD    Anesthesia Type: general ASA Status: 2          Anesthesia Type: general    Vitals  Vitals Value Taken Time   /75 09/21/22 0920   Temp 97.6 °F (36.4 °C) 09/21/22 0917   Pulse 56 09/21/22 0920   Resp 9 09/21/22 0917   SpO2 91 % 09/21/22 0920   Vitals shown include unvalidated device data.        Post Anesthesia Care and Evaluation    Patient location during evaluation: PACU  Patient participation: complete - patient participated  Level of consciousness: awake  Pain score: 0  Pain management: adequate    Airway patency: patent  Anesthetic complications: No anesthetic complications  PONV Status: none  Cardiovascular status: acceptable  Respiratory status: acceptable  Hydration status: acceptable    Comments: Patient seen and examined postoperatively; vital signs stable; SpO2 greater than or equal to 90%; cardiopulmonary status stable; nausea/vomiting adequately controlled; pain adequately controlled; no apparent anesthesia complications; patient discharged from anesthesia care when discharge criteria were met

## 2022-09-22 LAB
LAB AP CASE REPORT: NORMAL
PATH REPORT.FINAL DX SPEC: NORMAL
PATH REPORT.GROSS SPEC: NORMAL

## 2022-09-23 ENCOUNTER — TELEPHONE (OUTPATIENT)
Dept: SURGERY | Facility: CLINIC | Age: 77
End: 2022-09-23

## 2022-09-27 RX ORDER — PROMETHAZINE HYDROCHLORIDE 12.5 MG/1
12.5 TABLET ORAL EVERY 6 HOURS PRN
Qty: 10 TABLET | Refills: 0 | Status: SHIPPED | OUTPATIENT
Start: 2022-09-27 | End: 2022-10-27

## 2022-09-27 NOTE — TELEPHONE ENCOUNTER
Spoke with pt. States was doing alright but last couple days has had some vomiting. Teresa 2 wk po appt. Will fu then. Knows to call with any issues or concerns.

## 2022-10-04 ENCOUNTER — OFFICE VISIT (OUTPATIENT)
Dept: SURGERY | Facility: CLINIC | Age: 77
End: 2022-10-04

## 2022-10-04 ENCOUNTER — LAB (OUTPATIENT)
Dept: LAB | Facility: HOSPITAL | Age: 77
End: 2022-10-04

## 2022-10-04 VITALS
RESPIRATION RATE: 18 BRPM | TEMPERATURE: 98.2 F | SYSTOLIC BLOOD PRESSURE: 130 MMHG | DIASTOLIC BLOOD PRESSURE: 74 MMHG | BODY MASS INDEX: 30.76 KG/M2 | HEART RATE: 62 BPM | OXYGEN SATURATION: 100 % | HEIGHT: 66 IN | WEIGHT: 191.4 LBS

## 2022-10-04 DIAGNOSIS — K80.20 SYMPTOMATIC CHOLELITHIASIS: ICD-10-CM

## 2022-10-04 DIAGNOSIS — K80.20 SYMPTOMATIC CHOLELITHIASIS: Primary | ICD-10-CM

## 2022-10-04 LAB
ALBUMIN SERPL-MCNC: 4.1 G/DL (ref 3.5–5.2)
ALBUMIN/GLOB SERPL: 1.5 G/DL
ALP SERPL-CCNC: 87 U/L (ref 39–117)
ALT SERPL W P-5'-P-CCNC: 16 U/L (ref 1–33)
ANION GAP SERPL CALCULATED.3IONS-SCNC: 12.7 MMOL/L (ref 5–15)
AST SERPL-CCNC: 19 U/L (ref 1–32)
BASOPHILS # BLD AUTO: 0.1 10*3/MM3 (ref 0–0.2)
BASOPHILS NFR BLD AUTO: 1.1 % (ref 0–1.5)
BILIRUB SERPL-MCNC: 0.5 MG/DL (ref 0–1.2)
BUN SERPL-MCNC: 10 MG/DL (ref 8–23)
BUN/CREAT SERPL: 10.6 (ref 7–25)
CALCIUM SPEC-SCNC: 10.2 MG/DL (ref 8.6–10.5)
CHLORIDE SERPL-SCNC: 103 MMOL/L (ref 98–107)
CO2 SERPL-SCNC: 26.3 MMOL/L (ref 22–29)
CREAT SERPL-MCNC: 0.94 MG/DL (ref 0.57–1)
DEPRECATED RDW RBC AUTO: 41.5 FL (ref 37–54)
EGFRCR SERPLBLD CKD-EPI 2021: 62.2 ML/MIN/1.73
EOSINOPHIL # BLD AUTO: 0.72 10*3/MM3 (ref 0–0.4)
EOSINOPHIL NFR BLD AUTO: 7.8 % (ref 0.3–6.2)
ERYTHROCYTE [DISTWIDTH] IN BLOOD BY AUTOMATED COUNT: 13 % (ref 12.3–15.4)
GLOBULIN UR ELPH-MCNC: 2.7 GM/DL
GLUCOSE SERPL-MCNC: 91 MG/DL (ref 65–99)
HCT VFR BLD AUTO: 40.8 % (ref 34–46.6)
HGB BLD-MCNC: 13.3 G/DL (ref 12–15.9)
IMM GRANULOCYTES # BLD AUTO: 0.04 10*3/MM3 (ref 0–0.05)
IMM GRANULOCYTES NFR BLD AUTO: 0.4 % (ref 0–0.5)
LYMPHOCYTES # BLD AUTO: 2.49 10*3/MM3 (ref 0.7–3.1)
LYMPHOCYTES NFR BLD AUTO: 27 % (ref 19.6–45.3)
MCH RBC QN AUTO: 28.9 PG (ref 26.6–33)
MCHC RBC AUTO-ENTMCNC: 32.6 G/DL (ref 31.5–35.7)
MCV RBC AUTO: 88.5 FL (ref 79–97)
MONOCYTES # BLD AUTO: 1.04 10*3/MM3 (ref 0.1–0.9)
MONOCYTES NFR BLD AUTO: 11.3 % (ref 5–12)
NEUTROPHILS NFR BLD AUTO: 4.83 10*3/MM3 (ref 1.7–7)
NEUTROPHILS NFR BLD AUTO: 52.4 % (ref 42.7–76)
NRBC BLD AUTO-RTO: 0 /100 WBC (ref 0–0.2)
PLATELET # BLD AUTO: 285 10*3/MM3 (ref 140–450)
PMV BLD AUTO: 10.2 FL (ref 6–12)
POTASSIUM SERPL-SCNC: 4.1 MMOL/L (ref 3.5–5.2)
PROT SERPL-MCNC: 6.8 G/DL (ref 6–8.5)
RBC # BLD AUTO: 4.61 10*6/MM3 (ref 3.77–5.28)
SODIUM SERPL-SCNC: 142 MMOL/L (ref 136–145)
WBC NRBC COR # BLD: 9.22 10*3/MM3 (ref 3.4–10.8)

## 2022-10-04 PROCEDURE — 36415 COLL VENOUS BLD VENIPUNCTURE: CPT

## 2022-10-04 PROCEDURE — 80053 COMPREHEN METABOLIC PANEL: CPT

## 2022-10-04 PROCEDURE — 85025 COMPLETE CBC W/AUTO DIFF WBC: CPT

## 2022-10-04 PROCEDURE — 99024 POSTOP FOLLOW-UP VISIT: CPT | Performed by: SURGERY

## 2022-10-04 RX ORDER — ATORVASTATIN CALCIUM 20 MG/1
20 TABLET, FILM COATED ORAL EVERY EVENING
COMMUNITY
Start: 2022-09-08 | End: 2022-10-27

## 2022-10-04 NOTE — PROGRESS NOTES
"Subjective   Rachael Yen is a 77 y.o. female.   77-year-old lady who was sent to me from the emergency department with upper abdominal symptoms and findings of gallstones on imaging.  She underwent laparoscopic appendectomy 2 weeks ago.  She is here for follow-up.  She says she is doing okay but her symptoms persist.  She continues to have upper abdominal pain intermittent nausea and vomiting especially when she stops taking Phenergan.  Denies any fevers or chills she says her bowels are moving fairly regularly.    Objective   /74 (BP Location: Left arm, Patient Position: Sitting, Cuff Size: Adult)   Pulse 62   Temp 98.2 °F (36.8 °C) (Infrared)   Resp 18   Ht 167.6 cm (66\")   Wt 86.8 kg (191 lb 6.4 oz)   SpO2 100%   BMI 30.89 kg/m²   Physical Exam  Abdomen is soft nondistended nontender to palpation incisions are all healing well without erythema or drainage  Assessment & Plan   Diagnoses and all orders for this visit:    1. Symptomatic cholelithiasis (Primary)  -     CBC & Differential; Future  -     Comprehensive Metabolic Panel; Future    Pathology reviewed consistent with chronic cholecystitis and cholelithiasis    Unsure as to whether or not this operation actually helped her.  She is has persistent symptoms that occur whenever she stops taking the nausea medication we will go ahead and check some blood and make a decision about further intervention afterwards.  If her blood work is normal she probably needs evaluation by medical team to see if there is an alternative etiology for her symptoms if there is abnormalities on her labs we will consider additional imaging.     She does not have any diet restrictions or activity striction's for my standpoint.  If these labs are normal have her follow with me as needed and follow-up with primary medicine in the next week or 2    Joaquin Waite MD  10/4/2022  9:30 AM EDT    This note was created using Dragon Voice Recognition software.  "

## 2022-10-27 ENCOUNTER — OFFICE VISIT (OUTPATIENT)
Dept: CARDIAC SURGERY | Facility: CLINIC | Age: 77
End: 2022-10-27

## 2022-10-27 VITALS
HEART RATE: 64 BPM | SYSTOLIC BLOOD PRESSURE: 120 MMHG | OXYGEN SATURATION: 98 % | RESPIRATION RATE: 20 BRPM | WEIGHT: 190 LBS | HEIGHT: 66 IN | TEMPERATURE: 97.1 F | DIASTOLIC BLOOD PRESSURE: 74 MMHG | BODY MASS INDEX: 30.53 KG/M2

## 2022-10-27 DIAGNOSIS — I77.810 AORTIC ECTASIA, THORACIC: Primary | ICD-10-CM

## 2022-10-27 PROCEDURE — 99203 OFFICE O/P NEW LOW 30 MIN: CPT | Performed by: NURSE PRACTITIONER

## 2022-10-28 PROBLEM — I77.810 AORTIC ECTASIA, THORACIC (HCC): Status: ACTIVE | Noted: 2022-10-28

## 2023-08-14 DIAGNOSIS — I71.20 THORACIC AORTIC ANEURYSM WITHOUT RUPTURE, UNSPECIFIED PART: Primary | ICD-10-CM

## 2023-08-29 ENCOUNTER — HOSPITAL ENCOUNTER (OUTPATIENT)
Dept: CT IMAGING | Facility: HOSPITAL | Age: 78
Discharge: HOME | End: 2023-08-29
Admitting: NURSE PRACTITIONER
Payer: MEDICARE

## 2023-08-29 DIAGNOSIS — I71.20 THORACIC AORTIC ANEURYSM WITHOUT RUPTURE, UNSPECIFIED PART: ICD-10-CM

## 2023-08-29 PROCEDURE — 71250 CT THORAX DX C-: CPT

## 2023-10-23 ENCOUNTER — TELEPHONE (OUTPATIENT)
Dept: CARDIAC SURGERY | Facility: CLINIC | Age: 78
End: 2023-10-23
Payer: MEDICARE

## (undated) DEVICE — 2, DISPOSABLE SUCTION/IRRIGATOR WITH DISPOSABLE TIP: Brand: STRYKEFLOW

## (undated) DEVICE — BLANKT WARM UPPR/BDY ARM/OUT 57X196CM

## (undated) DEVICE — THE KUMAR CATHETER®, USED IN CONJUNCTION WITH KUMAR CHOLANGIOGRAPHY® CLAMP, IS MEANT TO PROVIDE A MEANS OF LAPAROSCOPIC CHOLANGIOGRAPHY. IT COMPRISES A TRANSLUCENT TUBING ( 76 CM. LENGTH AND 16 GA. ) THAT CARRIES A 19 GA., 1.25 CM LONG NEEDLE AT THE END. THE KUMAR CATHETER® IS USED TO PUNCTURE THE HARTMANN'S POUCH OF THE GALLBLADDER FOR BILIARY ACCESS AND / OR ASPIRATION. PRODUCT IS LATEX FREE.: Brand: KUMAR CATHETER®

## (undated) DEVICE — LAPAROSCOPIC GAS CONDITIONING DEVICE.: Brand: INSUFLOW

## (undated) DEVICE — UNDYED BRAIDED (POLYGLACTIN 910), SYNTHETIC ABSORBABLE SUTURE: Brand: COATED VICRYL

## (undated) DEVICE — ENDOPATH XCEL WITH OPTIVIEW TECHNOLOGY BLADELESS TROCARS WITH STABILITY SLEEVES: Brand: ENDOPATH XCEL OPTIVIEW

## (undated) DEVICE — SUT VIC 0/0 UR6 27IN DYED J603H

## (undated) DEVICE — GENERAL LAPAROSCOPY CDS: Brand: MEDLINE INDUSTRIES, INC.

## (undated) DEVICE — UNDERGLV SURG BIOGEL INDICATOR LTX PF 7

## (undated) DEVICE — SYR PREFILL SALINE POSIFLUSH 10ML STRL

## (undated) DEVICE — ADHS SKIN PREMIERPRO EXOFIN TOPICAL HI/VISC .5ML

## (undated) DEVICE — KT SURG TURNOVER 050

## (undated) DEVICE — ENDOPATH XCEL BLADELESS TROCARS WITH STABILITY SLEEVES: Brand: ENDOPATH XCEL

## (undated) DEVICE — SOL IRR H2O BTL 1000ML STRL

## (undated) DEVICE — SLV SCD CALF HEMOFORCE DVT THERP REPROC MD

## (undated) DEVICE — ENDOPATH XCEL WITH OPTIVIEW TECHNOLOGY UNIVERSAL TROCAR STABILITY SLEEVES: Brand: ENDOPATH XCEL OPTIVIEW

## (undated) DEVICE — Device

## (undated) DEVICE — ENDOPATH 5MM CURVED SCISSORS WITH MONOPOLAR CAUTERY: Brand: ENDOPATH

## (undated) DEVICE — GLV SURG BIOGEL LTX PF 7